# Patient Record
Sex: FEMALE | Race: WHITE | NOT HISPANIC OR LATINO | Employment: OTHER | URBAN - METROPOLITAN AREA
[De-identification: names, ages, dates, MRNs, and addresses within clinical notes are randomized per-mention and may not be internally consistent; named-entity substitution may affect disease eponyms.]

---

## 2017-01-31 ENCOUNTER — TELEPHONE (OUTPATIENT)
Dept: GASTROENTEROLOGY | Facility: CLINIC | Age: 62
End: 2017-01-31

## 2017-01-31 NOTE — TELEPHONE ENCOUNTER
----- Message from Fransisco Bravo MD sent at 1/31/2017  3:34 PM CST -----  Contact: self - [phone]  Nothing stronger. No narcotics either.    ----- Message -----     From: Joslyn Holder MA     Sent: 1/31/2017   3:30 PM       To: Fransisco Bravo MD    Please advise   ----- Message -----     From: Leta Cannon     Sent: 1/31/2017   3:23 PM       To: Lawrence Bravo - wants something stronger than dicyclomine for her colon spasms - please call patient at

## 2017-03-08 ENCOUNTER — HISTORICAL (OUTPATIENT)
Dept: ADMINISTRATIVE | Facility: HOSPITAL | Age: 62
End: 2017-03-08

## 2017-03-14 ENCOUNTER — HISTORICAL (OUTPATIENT)
Dept: ADMINISTRATIVE | Facility: HOSPITAL | Age: 62
End: 2017-03-14

## 2017-03-21 ENCOUNTER — HISTORICAL (OUTPATIENT)
Dept: SURGERY | Facility: HOSPITAL | Age: 62
End: 2017-03-21

## 2017-05-15 ENCOUNTER — HISTORICAL (OUTPATIENT)
Dept: RADIOLOGY | Facility: HOSPITAL | Age: 62
End: 2017-05-15

## 2017-06-05 ENCOUNTER — HISTORICAL (OUTPATIENT)
Dept: ADMINISTRATIVE | Facility: HOSPITAL | Age: 62
End: 2017-06-05

## 2017-06-05 LAB
APPEARANCE, UA: ABNORMAL
BACTERIA #/AREA URNS AUTO: ABNORMAL /[HPF]
BILIRUB UR QL STRIP: NEGATIVE
COLOR UR: ABNORMAL
GLUCOSE (UA): NORMAL
HGB UR QL STRIP: 0.1 MG/DL
HYALINE CASTS #/AREA URNS LPF: ABNORMAL /[LPF]
KETONES UR QL STRIP: NEGATIVE
LEUKOCYTE ESTERASE UR QL STRIP: 75 LEU/UL
MUCOUS THREADS URNS QL MICRO: ABNORMAL
NITRITE UR QL STRIP: NEGATIVE
PH UR STRIP: 5.5 [PH] (ref 4.5–8)
PROT UR QL STRIP: 10 MG/DL
RBC #/AREA URNS AUTO: ABNORMAL /[HPF]
SP GR UR STRIP: 1.03 (ref 1–1.03)
SQUAMOUS #/AREA URNS LPF: ABNORMAL /[LPF]
UROBILINOGEN UR STRIP-ACNC: NORMAL
WBC #/AREA URNS AUTO: ABNORMAL /HPF

## 2017-06-07 LAB — FINAL CULTURE: NORMAL

## 2018-04-02 ENCOUNTER — HISTORICAL (OUTPATIENT)
Dept: LAB | Facility: HOSPITAL | Age: 63
End: 2018-04-02

## 2018-04-02 LAB
ABS NEUT (OLG): 2.96 X10(3)/MCL (ref 2.1–9.2)
BASOPHILS # BLD AUTO: 0 X10(3)/MCL (ref 0–0.2)
BASOPHILS NFR BLD AUTO: 0 %
BUN SERPL-MCNC: 23 MG/DL (ref 7–18)
CALCIUM SERPL-MCNC: 9.2 MG/DL (ref 8.5–10.1)
CHLORIDE SERPL-SCNC: 105 MMOL/L (ref 98–107)
CO2 SERPL-SCNC: 31 MMOL/L (ref 21–32)
CREAT SERPL-MCNC: 0.51 MG/DL (ref 0.55–1.02)
CREAT/UREA NIT SERPL: 45.1
EOSINOPHIL # BLD AUTO: 0.1 X10(3)/MCL (ref 0–0.9)
EOSINOPHIL NFR BLD AUTO: 2 %
ERYTHROCYTE [DISTWIDTH] IN BLOOD BY AUTOMATED COUNT: 12.6 % (ref 11.5–17)
GLUCOSE SERPL-MCNC: 80 MG/DL (ref 74–106)
HCT VFR BLD AUTO: 36 % (ref 37–47)
HGB BLD-MCNC: 11.6 GM/DL (ref 12–16)
LYMPHOCYTES # BLD AUTO: 2 X10(3)/MCL (ref 0.6–4.6)
LYMPHOCYTES NFR BLD AUTO: 34 %
MCH RBC QN AUTO: 28.9 PG (ref 27–31)
MCHC RBC AUTO-ENTMCNC: 32.2 GM/DL (ref 33–36)
MCV RBC AUTO: 89.6 FL (ref 80–94)
MONOCYTES # BLD AUTO: 0.7 X10(3)/MCL (ref 0.1–1.3)
MONOCYTES NFR BLD AUTO: 12 %
NEUTROPHILS # BLD AUTO: 2.96 X10(3)/MCL (ref 1.4–7.9)
NEUTROPHILS NFR BLD AUTO: 51 %
PLATELET # BLD AUTO: 194 X10(3)/MCL (ref 130–400)
PMV BLD AUTO: 10.3 FL (ref 9.4–12.4)
POTASSIUM SERPL-SCNC: 4 MMOL/L (ref 3.5–5.1)
RBC # BLD AUTO: 4.02 X10(6)/MCL (ref 4.2–5.4)
SODIUM SERPL-SCNC: 140 MMOL/L (ref 136–145)
TSH SERPL-ACNC: <0.007 MIU/ML (ref 0.36–3.74)
WBC # SPEC AUTO: 5.8 X10(3)/MCL (ref 4.5–11.5)

## 2018-04-12 ENCOUNTER — HISTORICAL (OUTPATIENT)
Dept: RADIOLOGY | Facility: HOSPITAL | Age: 63
End: 2018-04-12

## 2018-06-25 ENCOUNTER — HISTORICAL (OUTPATIENT)
Dept: INFUSION THERAPY | Facility: HOSPITAL | Age: 63
End: 2018-06-25

## 2018-07-02 ENCOUNTER — HISTORICAL (OUTPATIENT)
Dept: INFUSION THERAPY | Facility: HOSPITAL | Age: 63
End: 2018-07-02

## 2018-07-09 ENCOUNTER — HISTORICAL (OUTPATIENT)
Dept: INFUSION THERAPY | Facility: HOSPITAL | Age: 63
End: 2018-07-09

## 2018-07-16 ENCOUNTER — HISTORICAL (OUTPATIENT)
Dept: INFUSION THERAPY | Facility: HOSPITAL | Age: 63
End: 2018-07-16

## 2018-09-12 ENCOUNTER — HISTORICAL (OUTPATIENT)
Dept: ADMINISTRATIVE | Facility: HOSPITAL | Age: 63
End: 2018-09-12

## 2018-09-12 LAB
ABS NEUT (OLG): 2.65 X10(3)/MCL (ref 2.1–9.2)
ALBUMIN SERPL-MCNC: 3.5 GM/DL (ref 3.4–5)
ALBUMIN/GLOB SERPL: 1.3 {RATIO}
ALP SERPL-CCNC: 193 UNIT/L (ref 38–126)
ALT SERPL-CCNC: 23 UNIT/L (ref 12–78)
APPEARANCE, UA: CLEAR
AST SERPL-CCNC: 12 UNIT/L (ref 15–37)
BACTERIA SPEC CULT: ABNORMAL /HPF
BASOPHILS # BLD AUTO: 0 X10(3)/MCL (ref 0–0.2)
BASOPHILS NFR BLD AUTO: 0 %
BILIRUB SERPL-MCNC: 0.5 MG/DL (ref 0.2–1)
BILIRUB UR QL STRIP: NEGATIVE
BILIRUBIN DIRECT+TOT PNL SERPL-MCNC: 0.1 MG/DL (ref 0–0.2)
BILIRUBIN DIRECT+TOT PNL SERPL-MCNC: 0.4 MG/DL (ref 0–0.8)
BUN SERPL-MCNC: 16 MG/DL (ref 7–18)
CALCIUM SERPL-MCNC: 8.8 MG/DL (ref 8.5–10.1)
CHLORIDE SERPL-SCNC: 109 MMOL/L (ref 98–107)
CHOLEST SERPL-MCNC: 178 MG/DL (ref 0–200)
CHOLEST/HDLC SERPL: 2.9 {RATIO} (ref 0–4)
CO2 SERPL-SCNC: 30 MMOL/L (ref 21–32)
COLOR UR: ABNORMAL
CREAT SERPL-MCNC: 0.71 MG/DL (ref 0.55–1.02)
EOSINOPHIL # BLD AUTO: 0.1 X10(3)/MCL (ref 0–0.9)
EOSINOPHIL NFR BLD AUTO: 1 %
ERYTHROCYTE [DISTWIDTH] IN BLOOD BY AUTOMATED COUNT: 12.6 % (ref 11.5–17)
GLOBULIN SER-MCNC: 2.7 GM/DL (ref 2.4–3.5)
GLUCOSE (UA): NEGATIVE
GLUCOSE SERPL-MCNC: 111 MG/DL (ref 74–106)
HCT VFR BLD AUTO: 41.1 % (ref 37–47)
HDLC SERPL-MCNC: 62 MG/DL (ref 35–60)
HGB BLD-MCNC: 13 GM/DL (ref 12–16)
HGB UR QL STRIP: NEGATIVE
KETONES UR QL STRIP: ABNORMAL
LDLC SERPL CALC-MCNC: 96 MG/DL (ref 0–129)
LEUKOCYTE ESTERASE UR QL STRIP: ABNORMAL
LYMPHOCYTES # BLD AUTO: 1.6 X10(3)/MCL (ref 0.6–4.6)
LYMPHOCYTES NFR BLD AUTO: 33 %
MCH RBC QN AUTO: 29.5 PG (ref 27–31)
MCHC RBC AUTO-ENTMCNC: 31.6 GM/DL (ref 33–36)
MCV RBC AUTO: 93.4 FL (ref 80–94)
MONOCYTES # BLD AUTO: 0.4 X10(3)/MCL (ref 0.1–1.3)
MONOCYTES NFR BLD AUTO: 10 %
NEUTROPHILS # BLD AUTO: 2.65 X10(3)/MCL (ref 1.4–7.9)
NEUTROPHILS NFR BLD AUTO: 56 %
NITRITE UR QL STRIP: NEGATIVE
PH UR STRIP: 6.5 [PH] (ref 5–9)
PLATELET # BLD AUTO: 166 X10(3)/MCL (ref 130–400)
PMV BLD AUTO: 10.1 FL (ref 9.4–12.4)
POTASSIUM SERPL-SCNC: 4.1 MMOL/L (ref 3.5–5.1)
PROT SERPL-MCNC: 6.2 GM/DL (ref 6.4–8.2)
PROT UR QL STRIP: NEGATIVE
RBC # BLD AUTO: 4.4 X10(6)/MCL (ref 4.2–5.4)
RBC #/AREA URNS HPF: 2 /HPF (ref 0–2)
SODIUM SERPL-SCNC: 145 MMOL/L (ref 136–145)
SP GR UR STRIP: 1.03 (ref 1–1.03)
SQUAMOUS EPITHELIAL, UA: 5 /HPF (ref 0–4)
TRIGL SERPL-MCNC: 101 MG/DL (ref 30–150)
TSH SERPL-ACNC: <0.007 MIU/L (ref 0.36–3.74)
UROBILINOGEN UR STRIP-ACNC: 0.2
VLDLC SERPL CALC-MCNC: 20 MG/DL
WBC # SPEC AUTO: 4.7 X10(3)/MCL (ref 4.5–11.5)
WBC #/AREA URNS HPF: 3 /HPF (ref 0–3)

## 2021-10-26 ENCOUNTER — HISTORICAL (OUTPATIENT)
Dept: RADIOLOGY | Facility: HOSPITAL | Age: 66
End: 2021-10-26

## 2021-11-01 ENCOUNTER — HISTORICAL (OUTPATIENT)
Dept: ANESTHESIOLOGY | Facility: HOSPITAL | Age: 66
End: 2021-11-01

## 2022-04-29 VITALS
BODY MASS INDEX: 31.18 KG/M2 | HEIGHT: 66 IN | WEIGHT: 194 LBS | DIASTOLIC BLOOD PRESSURE: 85 MMHG | SYSTOLIC BLOOD PRESSURE: 141 MMHG

## 2022-05-04 NOTE — HISTORICAL [facility] CERNER
This is a historical note converted from Leon. Formatting and pictures may have been removed.  Please reference Leon for original formatting and attached multimedia. Chief Complaint  IC, SPASMS OF BLADDER  History of Present Illness  63yo with h/o chronic IC s/p cystoscopy with hydrodistension on 3/21/17 presents for f/u of IC symptoms. She reports much improvement with her symptoms the month following her cystoscopy; however she reports increased symptoms of suprapubic cramping and back pain.?She has previously been compliant with the IC diet, but had strawberries ~1wk ago with worsening in symptoms. States that she sometimes has to force urine out and experiences bladder pressure.?She has not taken her amitriptyline as she was concerned about the interaction with the ambien that she takes?to sleep. Also not taking cemitidine. Reports vaginal odor x1wk that smells fishy.  Review of Systems  See HPI  Physical Exam  Vitals & Measurements  T:?36.8? ?C ?(Oral)? HR:?60?(Peripheral)? RR:?20? BP:?141/85? HT:?168?cm? HT:?168?cm? WT:?88?kg? WT:?88?kg? BMI:?31.18?  ?  NAD, AAO  RRR  CTAB  Ab: soft, ND, suprapubic tenderness  : normal external genitalia, atrophic vaginal, thin white frothy vaginal discharge, no cervicitis, no bleeding, no CMT, small nontender uterus with no adnexal fullness or tenderness  Extrem: NT, no edema  ?  wet prep: + clue cells, + whiff test  Assessment/Plan  Ordered:  cimetidine, 400 mg = 1 tab(s), Oral, BID, X 30 day(s), # 60 tab(s), 3 Refill(s)  conjugated estrogens topical, 0.5 gm =, VAG, qPM, twice weekly at bedtime within the vagina, # 42.5 gm, 3 Refill(s)  metroNIDAZOLE, 500 mg = 1 tab(s), Oral, q12hr, X 7 day(s), # 14 tab(s), 0 Refill(s)  Urinalysis with Microscopic if Indicated, Routine collect, Urine, Order for future visit, 06/05/17 16:46:00 CDT, Stop date 06/05/17 16:46:00 CDT, Nurse collect, IC (interstitial cystitis), 06/05/17 16:46:00 CDT  Urine Culture [id number], Routine collect,  17 16:46:00 CDT, Order for future visit, Urine, Clean Catch, Nurse collect, Stop date 17 16:46:00 CDT, IC (interstitial cystitis)  -IC: recommend continued amitriptyline and cimetidine. Continue IC diet (symptoms previously controlled on diet). UA and culture today. RTC 3 months for f/u of symptoms  -Vaginal atrophy: Rx for premarin  -BV: Rx for flagyl   Problem List/Past Medical History  Back pain, chronic  Depression  Disc degeneration, lumbar  GERD (gastroesophageal reflux disease)  HLD (hyperlipidemia)  Hypertension  IBS (irritable bowel syndrome)  Interstitial cystitis  Obesity  Historical  Anxiety  Chronic pain syndrome  Constipation  Fibromyalgia  GERD (gastroesophageal reflux disease)  HTN (hypertension)  Hyperlipidemia  Restless legs  Tarsal tunnel syndrome  Procedure/Surgical History  Cystoscopy (None) (2017), Cystourethroscopy, with dilation of bladder for interstitial cystitis; general or conduction (spinal) anesthesia (2017), Release of tarsal tunnel (2013), Release, tarsal tunnel (posterior tibial nerve decompression). (2013), Arthroscopy, darrick total knee,  delivery, jaw surgery, stomach surgery, Tonsillectomy.  Medications  AcipHex 20 mg ([facility] Substitution), 40 mg, Oral, At Bedtime,? ?Not taking  Amitiza 24 mcg oral capsule, 24 mcg, 1 cap(s), Oral, At Bedtime,? ?Not taking  amitriptyline 10 mg oral tablet, 20 mg, 2 tab(s), Oral, Once a day (at bedtime), 3 refills  aspirin 81 mg oral tablet, 81 mg, 1 tab(s), Oral, Daily  atorvastatin 40 mg oral tablet, 40 mg, 1 tab(s), Oral, Daily  baclofen 10 mg oral tablet, 10 mg, 1 tab(s), Oral, TID  carvedilol 12.5 mg oral tablet, 12.5 mg, 1 tab(s), Oral, BID  Cleocin ORAL cap., 300 mg, Oral, TIDPC,? ?Not taking  ClonAZEpam 1 mg oral tablet, 1 mg, 1 tab(s), Oral, At Bedtime,? ?Not taking  Cymbalta 60 mg oral delayed release capsule, 60 mg, 1 cap(s), Oral, Daily  dicyclomine 20 mg oral tablet, 20 mg, 1 tab(s), Oral,  QID  DMSO-Hep-Lido-Dex IRRIGATION - 60 mL  Duexis 800 mg-26.6 mg oral tablet, 1 tab(s), Oral, TID,? ?Not taking  Elmiron 100 mg oral capsule, 100 mg, 1 cap(s), Oral, TIDAC, 3 refills,? ?Not taking  Fish Oil, 1200 mg, Oral, At Bedtime  Frova, 2 mg, Oral, TID, PRN,? ?Not taking  hyoscyamine 0.125 mg sublingual tablet, 0.125 mg, 1 tab(s), SL, q4hr, PRN, 3 refills,? ?Not taking  ibuprofen 800 mg oral tablet, 800 mg, 1 tab(s), Oral, TID  losartan 100 mg oral tablet, 100 mg, 1 tab(s), Oral, Daily  magnesium citrate 150 mg oral capsule  metoprolol succinate 50 mg oral tablet extended release, 50 mg, 1 tab(s), Oral, Daily,? ?Not taking: OUT  Micardis 80 mg oral tablet, 80 mg, 1 tab(s), Oral, At Bedtime,? ?Not taking  NAB/AMI/GN/LID/PRIL/GEORGETTE, 1 pump(s), TOP, 5x/Day  Neurontin 300 mg oral capsule, 300 mg, 1 cap(s), Oral, BID,? ?Not taking  potassium gluconate 550 mg oral tablet, 1 tab(s), Oral, 5x/Day  progesterone 200 mg oral capsule, 200 mg, 1 cap(s), Oral, At Bedtime,? ?Not taking  traMADOL 50 mg oral tablet, 50 mg, 1 tab(s), Oral, q6hr, PRN  Tylenol No 3 oral tablet  Urolet MB oral tablet  Vitamin D3 1000 intl units oral tablet, 1000 IntUnit, 1 tab(s), Oral, Daily  Zanaflex 2 mg oral capsule, 1 tab(s), Oral, q8hr,? ?Not taking  zolpidem 5 mg oral tablet, 10 mg, 2 tab(s), Oral, Once a day (at bedtime), PRN  Allergies  Tape, silk?(blisters)  penicillins?(rash)  Social History  Alcohol - Denies Alcohol Use  Never  Employment/School  Unemployed  Home/Environment  Lives with Spouse.  Nutrition/Health  Regular  Substance Abuse - Denies Substance Abuse  Never  Tobacco - Denies Tobacco Use  Never smoker  Family History  Congestive heart disease.: Mother.  Heart disease: Father.  Hypertension.: Brother.

## 2023-07-27 ENCOUNTER — HOSPITAL ENCOUNTER (EMERGENCY)
Facility: HOSPITAL | Age: 68
Discharge: HOME | End: 2023-07-27
Attending: STUDENT IN AN ORGANIZED HEALTH CARE EDUCATION/TRAINING PROGRAM
Payer: MEDICARE

## 2023-07-27 VITALS
HEIGHT: 66 IN | SYSTOLIC BLOOD PRESSURE: 159 MMHG | RESPIRATION RATE: 15 BRPM | TEMPERATURE: 98 F | HEART RATE: 67 BPM | DIASTOLIC BLOOD PRESSURE: 99 MMHG | OXYGEN SATURATION: 99 % | WEIGHT: 189 LBS | BODY MASS INDEX: 30.37 KG/M2

## 2023-07-27 DIAGNOSIS — R11.2 NAUSEA AND VOMITING, UNSPECIFIED VOMITING TYPE: ICD-10-CM

## 2023-07-27 DIAGNOSIS — N20.1 URETEROLITHIASIS: Primary | ICD-10-CM

## 2023-07-27 DIAGNOSIS — R10.32 LEFT LOWER QUADRANT ABDOMINAL PAIN: ICD-10-CM

## 2023-07-27 LAB
ALBUMIN SERPL-MCNC: 4.1 G/DL (ref 3.4–4.8)
ALBUMIN/GLOB SERPL: 1.5 RATIO (ref 1.1–2)
ALP SERPL-CCNC: 97 UNIT/L (ref 40–150)
ALT SERPL-CCNC: 9 UNIT/L (ref 0–55)
APPEARANCE UR: ABNORMAL
AST SERPL-CCNC: 22 UNIT/L (ref 5–34)
BACTERIA #/AREA URNS AUTO: ABNORMAL /HPF
BASOPHILS # BLD AUTO: 0.08 X10(3)/MCL
BASOPHILS NFR BLD AUTO: 0.9 %
BILIRUB UR QL STRIP.AUTO: NEGATIVE
BILIRUBIN DIRECT+TOT PNL SERPL-MCNC: 0.9 MG/DL
BUN SERPL-MCNC: 15.3 MG/DL (ref 9.8–20.1)
CALCIUM SERPL-MCNC: 9.1 MG/DL (ref 8.4–10.2)
CAOX CRY URNS QL MICRO: ABNORMAL /HPF
CHLORIDE SERPL-SCNC: 106 MMOL/L (ref 98–107)
CO2 SERPL-SCNC: 27 MMOL/L (ref 23–31)
COLOR UR: YELLOW
CREAT SERPL-MCNC: 1.01 MG/DL (ref 0.55–1.02)
EOSINOPHIL # BLD AUTO: 0.15 X10(3)/MCL (ref 0–0.9)
EOSINOPHIL NFR BLD AUTO: 1.7 %
ERYTHROCYTE [DISTWIDTH] IN BLOOD BY AUTOMATED COUNT: 12.8 % (ref 11.5–17)
GFR SERPLBLD CREATININE-BSD FMLA CKD-EPI: >60 MLS/MIN/1.73/M2
GLOBULIN SER-MCNC: 2.7 GM/DL (ref 2.4–3.5)
GLUCOSE SERPL-MCNC: 150 MG/DL (ref 82–115)
GLUCOSE UR QL STRIP.AUTO: NEGATIVE
HCT VFR BLD AUTO: 38.7 % (ref 37–47)
HGB BLD-MCNC: 12.2 G/DL (ref 12–16)
IMM GRANULOCYTES # BLD AUTO: 0.02 X10(3)/MCL (ref 0–0.04)
IMM GRANULOCYTES NFR BLD AUTO: 0.2 %
KETONES UR QL STRIP.AUTO: ABNORMAL
LEUKOCYTE ESTERASE UR QL STRIP.AUTO: ABNORMAL
LIPASE SERPL-CCNC: 39 U/L
LYMPHOCYTES # BLD AUTO: 3.05 X10(3)/MCL (ref 0.6–4.6)
LYMPHOCYTES NFR BLD AUTO: 35.5 %
MCH RBC QN AUTO: 28.4 PG (ref 27–31)
MCHC RBC AUTO-ENTMCNC: 31.5 G/DL (ref 33–36)
MCV RBC AUTO: 90.2 FL (ref 80–94)
MONOCYTES # BLD AUTO: 0.71 X10(3)/MCL (ref 0.1–1.3)
MONOCYTES NFR BLD AUTO: 8.3 %
MUCOUS THREADS URNS QL MICRO: ABNORMAL /LPF
NEUTROPHILS # BLD AUTO: 4.57 X10(3)/MCL (ref 2.1–9.2)
NEUTROPHILS NFR BLD AUTO: 53.4 %
NITRITE UR QL STRIP.AUTO: NEGATIVE
NRBC BLD AUTO-RTO: 0 %
PH UR STRIP.AUTO: 6.5 [PH]
PLATELET # BLD AUTO: 214 X10(3)/MCL (ref 130–400)
PMV BLD AUTO: 10.6 FL (ref 7.4–10.4)
POTASSIUM SERPL-SCNC: 3.9 MMOL/L (ref 3.5–5.1)
PROT SERPL-MCNC: 6.8 GM/DL (ref 5.8–7.6)
PROT UR QL STRIP.AUTO: NEGATIVE
RBC # BLD AUTO: 4.29 X10(6)/MCL (ref 4.2–5.4)
RBC #/AREA URNS AUTO: ABNORMAL /HPF
RBC UR QL AUTO: ABNORMAL
SODIUM SERPL-SCNC: 143 MMOL/L (ref 136–145)
SP GR UR STRIP.AUTO: 1.02 (ref 1–1.03)
SQUAMOUS #/AREA URNS AUTO: <5 /HPF
UROBILINOGEN UR STRIP-ACNC: 0.2
WBC # SPEC AUTO: 8.58 X10(3)/MCL (ref 4.5–11.5)
WBC #/AREA URNS AUTO: 17 /HPF

## 2023-07-27 PROCEDURE — 81001 URINALYSIS AUTO W/SCOPE: CPT | Performed by: EMERGENCY MEDICINE

## 2023-07-27 PROCEDURE — 85025 COMPLETE CBC W/AUTO DIFF WBC: CPT | Performed by: EMERGENCY MEDICINE

## 2023-07-27 PROCEDURE — 80053 COMPREHEN METABOLIC PANEL: CPT | Performed by: EMERGENCY MEDICINE

## 2023-07-27 PROCEDURE — 63600175 PHARM REV CODE 636 W HCPCS: Performed by: STUDENT IN AN ORGANIZED HEALTH CARE EDUCATION/TRAINING PROGRAM

## 2023-07-27 PROCEDURE — 25500020 PHARM REV CODE 255: Performed by: STUDENT IN AN ORGANIZED HEALTH CARE EDUCATION/TRAINING PROGRAM

## 2023-07-27 PROCEDURE — 87088 URINE BACTERIA CULTURE: CPT | Performed by: EMERGENCY MEDICINE

## 2023-07-27 PROCEDURE — 96360 HYDRATION IV INFUSION INIT: CPT | Mod: 59

## 2023-07-27 PROCEDURE — 83690 ASSAY OF LIPASE: CPT | Performed by: EMERGENCY MEDICINE

## 2023-07-27 PROCEDURE — 99285 EMERGENCY DEPT VISIT HI MDM: CPT | Mod: 25

## 2023-07-27 RX ORDER — KETOROLAC TROMETHAMINE 30 MG/ML
15 INJECTION, SOLUTION INTRAMUSCULAR; INTRAVENOUS
Status: DISCONTINUED | OUTPATIENT
Start: 2023-07-27 | End: 2023-07-27 | Stop reason: HOSPADM

## 2023-07-27 RX ORDER — TAMSULOSIN HYDROCHLORIDE 0.4 MG/1
0.4 CAPSULE ORAL DAILY
Qty: 10 CAPSULE | Refills: 0 | Status: SHIPPED | OUTPATIENT
Start: 2023-07-27 | End: 2023-08-06

## 2023-07-27 RX ORDER — KETOROLAC TROMETHAMINE 10 MG/1
10 TABLET, FILM COATED ORAL EVERY 6 HOURS
Qty: 16 TABLET | Refills: 0 | Status: SHIPPED | OUTPATIENT
Start: 2023-07-27 | End: 2023-07-31

## 2023-07-27 RX ADMIN — SODIUM CHLORIDE, POTASSIUM CHLORIDE, SODIUM LACTATE AND CALCIUM CHLORIDE 1000 ML: 600; 310; 30; 20 INJECTION, SOLUTION INTRAVENOUS at 07:07

## 2023-07-27 RX ADMIN — IOPAMIDOL 100 ML: 755 INJECTION, SOLUTION INTRAVENOUS at 07:07

## 2023-07-27 NOTE — DISCHARGE INSTRUCTIONS
Thanks for letting us take care of you today!  It is our goal to give you courteous care and to keep you comfortable and informed, if you have any questions before you leave I will be happy to try and answer them.    Here is some advice after your visit:    Your visit in the emergency department is NOT definitive care - please follow-up with your primary care doctor and/or specialist within 1-2 days. Please return to the emergency department if you develop worsening symptoms including: fever, chills, chest pain, shortness of breath, weakness, numbness, tingling, nausea, vomiting, inability to eat, drink, or take your medication. Please return if you have any worsening in your condition or if you have any other concerns.    If you had radiology exams like an XRAY or CT in the emergency Department the interpreation on them may be preliminary - there may be less time sensitive findings on the reports please obtain these reports within 24 hours from the hospital or by using your out on your mobile phone to access records.  Bring these to your primary care doctor and/or specialist for further review of incidental findings.    Please review any LAB WORK from your visit today with your primary care physician.    You have been prescribed ketorolac/Toradol.  If you are taking his medication please do not take any additional NSAIDs including celebrex/celecoxib,ibuprofen, naproxen, indomethacin.  Please stay hydrated when taking this medication.    Please follow up with your primary care physician, please return emergency department if you have any worsening symptoms fever chills nausea vomiting.    Please follow-up with your urologist.

## 2023-07-27 NOTE — ED PROVIDER NOTES
Encounter Date: 2023    SCRIBE #1 NOTE: I, Jennifer Marcella, am scribing for, and in the presence of,  Thomas Mackey MD. I have scribed the following portions of the note - Other sections scribed: HPI, ROS and physical.     History     Chief Complaint   Patient presents with    Abdominal Pain     C/o left lower abd pain that radiates to her back with n/v that began last night.      This is a 67 y/o female with a medical hx of HTN and chronic back pain that presents to the ED for abdominal pain onset . Pt states that the pain is present in the left lower quadrant of her abdomen and radiates into her back. Pt reports episodes of emesis last night,  with the most recent episode being a few hours ago. Denies dysuria, hematuria, diarrhea, fever, chills. Reports abdominal pain, back pain, n/v, chronic constipation.     Pt denies hx of diverticulitis and kidney stones. The pt's PCP is Haroldo Escalona MD        The history is provided by the patient. No  was used.   Review of patient's allergies indicates:   Allergen Reactions    Adhesive tape-silicones     Penicillins      Past Medical History:   Diagnosis Date    Chronic back pain     Hypertension     OA (osteoarthritis)      Past Surgical History:   Procedure Laterality Date     SECTION      MANDIBLE SURGERY      TOTAL KNEE ARTHROPLASTY      bilateral     Family History   Problem Relation Age of Onset    Colon cancer Neg Hx      Social History     Tobacco Use    Smoking status: Never   Substance Use Topics    Alcohol use: No     Review of Systems   Constitutional:  Negative for chills and fever.   Gastrointestinal:  Positive for abdominal pain, constipation, nausea and vomiting. Negative for diarrhea.   Genitourinary:  Negative for dysuria and hematuria.   Musculoskeletal:  Positive for back pain.     Physical Exam     Initial Vitals [23 0436]   BP Pulse Resp Temp SpO2   (!) 208/97 65 18 97.6 °F (36.4 °C) 98 %      MAP        --         Physical Exam    Nursing note and vitals reviewed.  Constitutional: She appears well-developed and well-nourished. She is not diaphoretic.  Non-toxic appearance. No distress.   HENT:   Head: Normocephalic and atraumatic.   Right Ear: External ear normal.   Left Ear: External ear normal.   Nose: Nose normal.   Eyes: Conjunctivae and EOM are normal. Pupils are equal, round, and reactive to light. Right eye exhibits no discharge. Left eye exhibits no discharge.   Cardiovascular:  Normal rate, regular rhythm, normal heart sounds and intact distal pulses.     Exam reveals no gallop and no friction rub.       No murmur heard.  Pulmonary/Chest: Breath sounds normal. No respiratory distress. She has no wheezes. She has no rhonchi. She has no rales. She exhibits no tenderness.   Abdominal: Abdomen is soft. Bowel sounds are normal. She exhibits no distension and no mass. There is abdominal tenderness in the left lower quadrant.   No CVA tenderness There is no rebound and no guarding.   Musculoskeletal:         General: No edema. Normal range of motion.     Neurological: She is alert and oriented to person, place, and time. No cranial nerve deficit or sensory deficit.   Skin: Skin is warm and dry. Capillary refill takes less than 2 seconds. No erythema. No pallor.       ED Course   Procedures  Labs Reviewed   COMPREHENSIVE METABOLIC PANEL - Abnormal; Notable for the following components:       Result Value    Glucose Level 150 (*)     All other components within normal limits   URINALYSIS, REFLEX TO URINE CULTURE - Abnormal; Notable for the following components:    Appearance, UA Cloudy (*)     Ketones, UA Trace (*)     Blood, UA 2+ (*)     Leukocyte Esterase, UA 2+ (*)     All other components within normal limits   CBC WITH DIFFERENTIAL - Abnormal; Notable for the following components:    MCHC 31.5 (*)     MPV 10.6 (*)     All other components within normal limits   URINALYSIS, MICROSCOPIC - Abnormal; Notable  for the following components:    RBC, UA 11-20 (*)     WBC, UA 17 (*)     Mucous, UA Moderate (*)     Calcium Oxalate Crystals, UA Few (*)     All other components within normal limits   LIPASE - Normal   CULTURE, URINE   CBC W/ AUTO DIFFERENTIAL    Narrative:     The following orders were created for panel order CBC W/ AUTO DIFFERENTIAL.  Procedure                               Abnormality         Status                     ---------                               -----------         ------                     CBC with Differential[[id number]]        Abnormal            Final result                 Please view results for these tests on the individual orders.          Imaging Results              CT Abdomen Pelvis With Contrast (Final result)  Result time 07/27/23 08:29:05      Final result by eZ Hendrickson MD (07/27/23 08:29:05)                   Impression:      3 mm proximal left ureteral stone resulting in mild left hydronephrosis.      Electronically signed by: Ze Hendrickson  Date:    07/27/2023  Time:    08:29               Narrative:    EXAMINATION:  CT ABDOMEN PELVIS WITH CONTRAST    CLINICAL HISTORY:  LLQ abdominal pain;    TECHNIQUE:  CT imaging of the abdomen and pelvis after intravenous contrast. Dose length product 469 mGycm. Automatic exposure control, adjustment of mA/kV or iterative reconstruction technique used to limit radiation dose.    COMPARISON:  CT 06/26/2014    FINDINGS:  Liver/biliary: Normal liver.  No radiodense gallstones. No intra or extrahepatic biliary ductal dilation.    Pancreas: Normal.    Spleen: Normal.    Adrenals: Normal.    Genitourinary: 3 mm stone in the proximal left ureter.  It results in mild left hydronephrosis.  No collecting system dilatation on the right.  Bladder within normal limits. No pelvic mass.    Stomach/bowel: No evidence of bowel obstruction. Appendix not confidently seen.  No definitive sites of bowel inflammation.    Lymph nodes/peritoneum: No  pathologically enlarged lymph node identified. No ascites or free air. No fluid collection.    Vasculature: Mild aortic and iliac artery calcifications.    Abdominal wall: Normal.    Lung bases: No consolidation or pleural effusion.    Musculoskeletal: No acute osseous findings.                                       Medications   ketorolac injection 15 mg (15 mg Intravenous Not Given 7/27/23 0900)   lactated ringers bolus 1,000 mL (0 mLs Intravenous Stopped 7/27/23 0821)   iopamidoL (ISOVUE-370) injection 100 mL (100 mLs Intravenous Given 7/27/23 0751)              Scribe Attestation:   Scribe #1: I performed the above scribed service and the documentation accurately describes the services I performed. I attest to the accuracy of the note.    Attending Attestation:           Physician Attestation for Scribe:  Physician Attestation Statement for Scribe #1: I, Thomas Mackey MD, reviewed documentation, as scribed by Jennifer Naranjo in my presence, and it is both accurate and complete.       Medical Decision Making  Patient presents with left lower quadrant abdominal pain.    Differential Diagnosis includes, but is not limited to: appendicitis, diverticulitis,  intraabdominal abcess, retroperitoneal abcess, gastritis, gastroenteritis, hernia, pancreatitis, inflammatory bowel disease, PUD, SBP, nephrolithiasis, DKA, consitpation, GERD, IBS    No diverticulitis on CT.  There is a 3 mm stone.  Expected to pass this spontaneously due to small size.  We will discharge.  Please see ED course for further MDM.      Amount and/or Complexity of Data Reviewed  External Data Reviewed: notes.     Details: See ED course  Labs: ordered. Decision-making details documented in ED Course.  Radiology: ordered and independent interpretation performed.     Details: Small stone    Risk  Prescription drug management.           ED Course as of 07/27/23 1008   Thu Jul 27, 2023   0636 Lipase: 39 [MM]   0636 Comp. Metabolic Panel(!)  No  electrolyte abnormality, no renal dysfunction.  Hyperglycemia. [MM]   0636 CBC W/ AUTO DIFFERENTIAL(!)  No anemia, no leukocytosis. [MM]   0639 Chart review reveals office visit with maria [facility] 04/06/2023.  Diagnosed at that time history of irritable bowel syndrome, Graves, essential hypertension.  Reportedly at that time had a good appetite. [MM]   0845 Urinalysis, Microscopic(!)  RBCs and WBCs however no bacteria seen. [MM]   0910 Patient is awake alert well-appearing.  Able to ambulate back and forth to the restroom without any difficulty.  On re-evaluation she denies any worsening symptoms.  CT scan shows 3 mm stone.  She was no evidence of infection.  Already is on pain contract has some pain medicine at home.  Will discharge with Toradol, Flomax.  Reports she was no longer taking the Celebrex.  Suitable for discharge home at this time.  Already has a urologist in [place].  Return precautions given.  Questions invited, questions answered to the best my ability.  Patient discharged home condition stable.   [MM]      ED Course User Index  [MM] Thomas Mackey MD                 Clinical Impression:   Final diagnoses:  [N20.1] Ureterolithiasis (Primary)  [R10.32] Left lower quadrant abdominal pain  [R11.2] Nausea and vomiting, unspecified vomiting type        ED Disposition Condition    Discharge Stable          ED Prescriptions       Medication Sig Dispense Start Date End Date Auth. Provider    tamsulosin (FLOMAX) 0.4 mg Cap Take 1 capsule (0.4 mg total) by mouth once daily. for 10 days 10 capsule 7/27/2023 8/6/2023 Thomas Mackey MD    ketorolac (TORADOL) 10 mg tablet Take 1 tablet (10 mg total) by mouth every 6 (six) hours. for 4 days 16 tablet 7/27/2023 7/31/2023 Thomas Mackey MD          Follow-up Information       Follow up With Specialties Details Why Contact Info    Haroldo Escalona MD Family Medicine Call   [address] [facility] [address] [phone]               Thomas  JUDE Mackey MD  07/27/23 1000

## 2023-07-29 LAB — BACTERIA UR CULT: NORMAL

## 2023-11-15 ENCOUNTER — HOSPITAL ENCOUNTER (EMERGENCY)
Facility: HOSPITAL | Age: 68
Discharge: HOME | End: 2023-11-15
Attending: EMERGENCY MEDICINE
Payer: MEDICARE

## 2023-11-15 VITALS
HEIGHT: 66 IN | SYSTOLIC BLOOD PRESSURE: 160 MMHG | WEIGHT: 185 LBS | HEART RATE: 63 BPM | OXYGEN SATURATION: 96 % | DIASTOLIC BLOOD PRESSURE: 86 MMHG | BODY MASS INDEX: 29.73 KG/M2 | TEMPERATURE: 97 F | RESPIRATION RATE: 17 BRPM

## 2023-11-15 DIAGNOSIS — N39.0 ACUTE UTI: ICD-10-CM

## 2023-11-15 DIAGNOSIS — R10.31 ACUTE RIGHT LOWER QUADRANT PAIN: Primary | ICD-10-CM

## 2023-11-15 LAB
ALBUMIN SERPL-MCNC: 4.3 G/DL (ref 3.4–4.8)
ALBUMIN/GLOB SERPL: 1.5 RATIO (ref 1.1–2)
ALP SERPL-CCNC: 76 UNIT/L (ref 40–150)
ALT SERPL-CCNC: 14 UNIT/L (ref 0–55)
APPEARANCE UR: CLEAR
AST SERPL-CCNC: 23 UNIT/L (ref 5–34)
BACTERIA #/AREA URNS AUTO: ABNORMAL /HPF
BASOPHILS # BLD AUTO: 0.07 X10(3)/MCL
BASOPHILS NFR BLD AUTO: 1 %
BILIRUB SERPL-MCNC: 2.1 MG/DL
BILIRUB UR QL STRIP.AUTO: ABNORMAL
BUN SERPL-MCNC: 13.2 MG/DL (ref 9.8–20.1)
CALCIUM SERPL-MCNC: 9.1 MG/DL (ref 8.4–10.2)
CHLORIDE SERPL-SCNC: 106 MMOL/L (ref 98–107)
CO2 SERPL-SCNC: 28 MMOL/L (ref 23–31)
COLOR UR AUTO: ABNORMAL
CREAT SERPL-MCNC: 0.88 MG/DL (ref 0.55–1.02)
EOSINOPHIL # BLD AUTO: 0.15 X10(3)/MCL (ref 0–0.9)
EOSINOPHIL NFR BLD AUTO: 2.1 %
ERYTHROCYTE [DISTWIDTH] IN BLOOD BY AUTOMATED COUNT: 14.9 % (ref 11.5–17)
GFR SERPLBLD CREATININE-BSD FMLA CKD-EPI: >60 MLS/MIN/1.73/M2
GLOBULIN SER-MCNC: 2.8 GM/DL (ref 2.4–3.5)
GLUCOSE SERPL-MCNC: 82 MG/DL (ref 82–115)
GLUCOSE UR QL STRIP.AUTO: NORMAL
HCT VFR BLD AUTO: 39.4 % (ref 37–47)
HGB BLD-MCNC: 12.4 G/DL (ref 12–16)
HYALINE CASTS #/AREA URNS LPF: ABNORMAL /LPF
IMM GRANULOCYTES # BLD AUTO: 0.01 X10(3)/MCL (ref 0–0.04)
IMM GRANULOCYTES NFR BLD AUTO: 0.1 %
KETONES UR QL STRIP.AUTO: NEGATIVE
LEUKOCYTE ESTERASE UR QL STRIP.AUTO: 500
LYMPHOCYTES # BLD AUTO: 3.4 X10(3)/MCL (ref 0.6–4.6)
LYMPHOCYTES NFR BLD AUTO: 48.4 %
MCH RBC QN AUTO: 28.7 PG (ref 27–31)
MCHC RBC AUTO-ENTMCNC: 31.5 G/DL (ref 33–36)
MCV RBC AUTO: 91.2 FL (ref 80–94)
MONOCYTES # BLD AUTO: 0.57 X10(3)/MCL (ref 0.1–1.3)
MONOCYTES NFR BLD AUTO: 8.1 %
MUCOUS THREADS URNS QL MICRO: ABNORMAL /LPF
NEUTROPHILS # BLD AUTO: 2.82 X10(3)/MCL (ref 2.1–9.2)
NEUTROPHILS NFR BLD AUTO: 40.3 %
NITRITE UR QL STRIP.AUTO: ABNORMAL
NRBC BLD AUTO-RTO: 0 %
PH UR STRIP.AUTO: 6.5 [PH]
PLATELET # BLD AUTO: 227 X10(3)/MCL (ref 130–400)
PMV BLD AUTO: 10.6 FL (ref 7.4–10.4)
POTASSIUM SERPL-SCNC: 4.3 MMOL/L (ref 3.5–5.1)
PROT SERPL-MCNC: 7.1 GM/DL (ref 5.8–7.6)
PROT UR QL STRIP.AUTO: NEGATIVE
RBC # BLD AUTO: 4.32 X10(6)/MCL (ref 4.2–5.4)
RBC #/AREA URNS AUTO: ABNORMAL /HPF
RBC UR QL AUTO: NEGATIVE
SODIUM SERPL-SCNC: 141 MMOL/L (ref 136–145)
SP GR UR STRIP.AUTO: 1.02 (ref 1–1.03)
SQUAMOUS #/AREA URNS LPF: ABNORMAL /HPF
UROBILINOGEN UR STRIP-ACNC: 4
WBC # SPEC AUTO: 7.02 X10(3)/MCL (ref 4.5–11.5)
WBC #/AREA URNS AUTO: ABNORMAL /HPF

## 2023-11-15 PROCEDURE — 99284 EMERGENCY DEPT VISIT MOD MDM: CPT | Mod: 25

## 2023-11-15 PROCEDURE — 80053 COMPREHEN METABOLIC PANEL: CPT | Performed by: EMERGENCY MEDICINE

## 2023-11-15 PROCEDURE — 87086 URINE CULTURE/COLONY COUNT: CPT | Performed by: EMERGENCY MEDICINE

## 2023-11-15 PROCEDURE — 85025 COMPLETE CBC W/AUTO DIFF WBC: CPT | Performed by: EMERGENCY MEDICINE

## 2023-11-15 PROCEDURE — 81001 URINALYSIS AUTO W/SCOPE: CPT | Performed by: EMERGENCY MEDICINE

## 2023-11-15 RX ORDER — CIPROFLOXACIN 500 MG/1
500 TABLET ORAL 2 TIMES DAILY
Qty: 14 TABLET | Refills: 0 | Status: SHIPPED | OUTPATIENT
Start: 2023-11-15 | End: 2023-11-15 | Stop reason: SDUPTHER

## 2023-11-15 RX ORDER — CIPROFLOXACIN 500 MG/1
500 TABLET ORAL 2 TIMES DAILY
Qty: 14 TABLET | Refills: 0 | Status: SHIPPED | OUTPATIENT
Start: 2023-11-15 | End: 2023-11-22

## 2023-11-15 NOTE — ED PROVIDER NOTES
"Encounter Date: 11/15/2023       History     Chief Complaint   Patient presents with    Abdominal Pain     Pt reports right lower abd pain that radiates to right lower back X 3 days. Pt unable to answer if she is experiencing n/v/d. Does report a lengthy hx of bladder concerns with procedures involving botox and emptying the bladder. LBM last night. Called PCP on call was prescribed Baclofen, did not relieve pain.      68-year-old female presents with right lower quadrant pain radiating toward the right flank aching cramping sharp in nature worse with palpation and with turning the torso.  Pain has been present for 3 days.  She denies any dysuria at present.  She is had problems with her bladder in the past is followed by Gabriela Haji had a cystoscope with Botox injection but she states it did not work and he wants to do another procedure on her but she is been putting it off for now because she is been busy at home.  She states recently, 1 month ago, she would an episode of hematuria with pain and they wrote her a prescription of Phenazopyridine which has helped.  Denies dysuria at present.  Denies bleeding at present.  States she is been constipated for last 3 days used a Fleet's enema did have a bowel movement yesterday.  She is not been taking any over-the-counter stool softeners "because I am stubborn."  She is now followed by pain management due to chronic pain in her SI joints and knee and hip.  She states she was told she has neuropathy.  She is on multiple medications including tramadol baclofen what she does not take it makes her sleepy, clonazepam which she took prior to arrival, duloxetine, gabapentin.  She has a prescription for hyoscyamine but she states she is not been taking it.  She is an old prescription of ciprofloxacin appears to have been from June but she did not complete it.  She does not know why she was on that medicine.  She reports she called her gastroenterologist because he was " having the pain in the right side they recommended she come to the ED to get evaluated to make sure she did not have appendicitis.  According to my review of the records patient has a history of hypertension chronic back pain and abdominal pain history of  section osteoarthritis hypertension , constipation, restless legs, anxiety, fibromyalgia, tarsal tunnel syndrome, colonic spasms recommended to see pain management.  She is now followed by pain management.        Review of patient's allergies indicates:   Allergen Reactions    Adhesive tape-silicones     Penicillins      Past Medical History:   Diagnosis Date    Chronic back pain     Hypertension     OA (osteoarthritis)      Past Surgical History:   Procedure Laterality Date     SECTION      MANDIBLE SURGERY      TOTAL KNEE ARTHROPLASTY      bilateral     Family History   Problem Relation Age of Onset    Colon cancer Neg Hx      Social History     Tobacco Use    Smoking status: Never   Substance Use Topics    Alcohol use: No     Review of Systems   Constitutional:  Negative for chills and fever.   Respiratory:  Negative for cough, chest tightness and shortness of breath.    Cardiovascular:  Negative for chest pain.   Gastrointestinal:  Positive for abdominal pain and constipation. Negative for blood in stool, diarrhea, nausea and vomiting.   Genitourinary:  Negative for dysuria, frequency, hematuria and urgency.   Musculoskeletal:  Negative for myalgias and neck pain.   All other systems reviewed and are negative.      Physical Exam     Initial Vitals [11/15/23 0609]   BP Pulse Resp Temp SpO2   (!) 163/97 62 17 97.3 °F (36.3 °C) 95 %      MAP       --         Physical Exam    Nursing note and vitals reviewed.  Constitutional: She appears well-developed and well-nourished. No distress.   HENT:   Head: Normocephalic and atraumatic.   Eyes: Conjunctivae are normal.   Cardiovascular:  Normal rate and intact distal pulses.           Pulmonary/Chest: No  respiratory distress. She has no rhonchi.   Abdominal: Abdomen is soft. Bowel sounds are normal.   Mild tenderness in the right lower quadrant there is no guarding there is no rebound pain is reproducible with range of motion.  No lumbar tenderness palpation or paraspinous tenderness to palpation There is no rebound and no guarding.   Musculoskeletal:         General: No edema.     Neurological: She is alert. She has normal strength.   Skin: Skin is warm and dry.   Psychiatric: She has a normal mood and affect.         ED Course   Procedures  Labs Reviewed   COMPREHENSIVE METABOLIC PANEL - Abnormal; Notable for the following components:       Result Value    Bilirubin Total 2.1 (*)     All other components within normal limits   URINALYSIS, REFLEX TO URINE CULTURE - Abnormal; Notable for the following components:    Bilirubin, UA 2+ (*)     Urobilinogen, UA 4.0 (*)     Nitrites, UA 2+ (*)     Leukocyte Esterase,  (*)     WBC, UA 21-50 (*)     Bacteria, UA Few (*)     Mucous, UA Moderate (*)     Hyaline Casts, UA 0-2 (*)     RBC, UA 6-10 (*)     All other components within normal limits   CBC WITH DIFFERENTIAL - Abnormal; Notable for the following components:    MCHC 31.5 (*)     MPV 10.6 (*)     All other components within normal limits   CULTURE, URINE   CBC W/ AUTO DIFFERENTIAL    Narrative:     The following orders were created for panel order CBC auto differential.  Procedure                               Abnormality         Status                     ---------                               -----------         ------                     CBC with Differential[[id number]]        Abnormal            Final result                 Please view results for these tests on the individual orders.   URINALYSIS, REFLEX TO URINE CULTURE          Imaging Results              CT Abdomen Pelvis  Without Contrast (Final result)  Result time 11/15/23 07:18:21      Final result by Calos Joseph MD (11/15/23 07:18:21)                    Impression:      No renal calculi or acute obstructive uropathy.      Electronically signed by: Calos Joseph  Date:    11/15/2023  Time:    07:18               Narrative:    EXAMINATION:  CT ABDOMEN PELVIS WITHOUT CONTRAST    CLINICAL HISTORY:  RLQ abdominal pain (Age >= 14y);R flank pain h/o stone;    TECHNIQUE:  Multidetector axial images were obtained from the  diaphragms to below symphysis pubis without the administration of IV contrast. Oral contrast was not administered.    Dose length product of 367 mGycm. Automated exposure control was utilized to minimize radiation dose.    COMPARISON:  July 27, 2023    FINDINGS:  Included lungs are without suspicious nodularity, acute air space infiltrates or fluid within the pleural spaces.    Within limitations of noncontrast technique, no acute findings of the liver, pancreas and spleen identified. Gallbladder wall is not thickened and there is no intraluminal calcified calculus. No apparent biliary dilation.    The adrenal glands noncontrast evaluation is unremarkable. Bilateral kidneys lobular contour reflects some scarring.  There is no hydronephrosis or nephrolithiasis. The ureters appear normal in course and diameter without intra ureteral stone.    The stomach is decompressed and difficult to assess.  Small bowel is normal in caliber.  There is no suggestion of acute appendicitis.  There is no free air or free fluid.  Pericolonic fat is preserved without acute inflammatory strandings. There is no evidence for bowel obstruction.    Urinary bladder appears within normal limits. No intravesical stone identified. There is no pelvic free fluid.    Scoliosis and multilevel degenerative changes of the lumbar spine.                                       Medications - No data to display  Medical Decision Making  Presently patient is comfortable no distress abdominal exam is benign clinically do not feel she has a appendicitis.  Will get labs for further  evaluation.  Discussed differential diagnosis and workup plans with the patient and with her .  They were concerned about a kidney stone she had 1 recently and they do not know if she ever passed it.  They believe that was on the right side.  The way she describes the pain has been worse with palpation and with turning her torso raises concern of abdominal wall pain.  Cystitis is a consideration.  Appendicitis is a consideration but felt to be less likely.  Kidney stone is a course concern as well.  Constipation could also be contributing to her symptoms and I recommend she use an over-the-counter stool softener such as MiraLax or Dulcolax to help as well.  She is followed by pain management has multiple pain medicines that she was taking.  I explained if the baclofen makes her sleepy they maybe up to try an alternative lower dose muscle relaxer if they feel she needs another medication.    Problems Addressed:  Acute right lower quadrant pain: acute illness or injury    Amount and/or Complexity of Data Reviewed  Labs: ordered.  Radiology: ordered.               ED Course as of 11/15/23 0855   Wed Nov 15, 2023   0750 CT scan is reassuring CT does not provide evidence of appendicitis and again no fever no white count low clinical suspicion of appendicitis.  No renal calculi visualized.  Electrolytes and CBC are unremarkable.  Urinalysis pending [LF]   0853 Urinalysis is concerning for urinary tract infection.  I will put her back on ciprofloxacin.  Counseled on the importance of taking the entire prescription [LF]      ED Course User Index  [LF] Jian Jones MD                    Clinical Impression:   Final diagnoses:  [R10.31] Acute right lower quadrant pain (Primary)  [N39.0] Acute UTI        ED Disposition Condition    Discharge Stable          ED Prescriptions       Medication Sig Dispense Start Date End Date Auth. Provider    ciprofloxacin HCl (CIPRO) 500 MG tablet Take 1 tablet (500 mg total) by  mouth 2 (two) times daily. for 7 days 14 tablet 11/15/2023 11/22/2023 Jian Jones MD          Follow-up Information       Follow up With Specialties Details Why Contact Info    Haroldo Escalona MD Family Medicine Schedule an appointment as soon as possible for a visit   [address] [facility] [address]  [phone]      Dyllan Haji MD Urology Schedule an appointment as soon as possible for a visit   [address] [facility] [address]  [phone]               Jian Jones MD  11/15/23 1081

## 2023-11-17 LAB — BACTERIA UR CULT: NORMAL

## 2023-11-20 ENCOUNTER — HOSPITAL ENCOUNTER (EMERGENCY)
Facility: HOSPITAL | Age: 68
Discharge: HOME | End: 2023-11-20
Attending: EMERGENCY MEDICINE
Payer: MEDICARE

## 2023-11-20 VITALS
DIASTOLIC BLOOD PRESSURE: 97 MMHG | RESPIRATION RATE: 18 BRPM | HEART RATE: 66 BPM | TEMPERATURE: 96 F | SYSTOLIC BLOOD PRESSURE: 129 MMHG | WEIGHT: 175 LBS | BODY MASS INDEX: 28.25 KG/M2 | OXYGEN SATURATION: 97 %

## 2023-11-20 DIAGNOSIS — K59.00 CONSTIPATION, UNSPECIFIED CONSTIPATION TYPE: Primary | ICD-10-CM

## 2023-11-20 DIAGNOSIS — K59.00 CONSTIPATION: ICD-10-CM

## 2023-11-20 LAB
ALBUMIN SERPL-MCNC: 4.2 G/DL (ref 3.4–4.8)
ALBUMIN/GLOB SERPL: 1.7 RATIO (ref 1.1–2)
ALP SERPL-CCNC: 76 UNIT/L (ref 40–150)
ALT SERPL-CCNC: 12 UNIT/L (ref 0–55)
APPEARANCE UR: ABNORMAL
AST SERPL-CCNC: 23 UNIT/L (ref 5–34)
BACTERIA #/AREA URNS AUTO: ABNORMAL /HPF
BASOPHILS # BLD AUTO: 0.05 X10(3)/MCL
BASOPHILS NFR BLD AUTO: 0.7 %
BILIRUB SERPL-MCNC: 2.5 MG/DL
BILIRUB UR QL STRIP.AUTO: ABNORMAL
BUN SERPL-MCNC: 12.9 MG/DL (ref 9.8–20.1)
CALCIUM SERPL-MCNC: 9 MG/DL (ref 8.4–10.2)
CHLORIDE SERPL-SCNC: 104 MMOL/L (ref 98–107)
CO2 SERPL-SCNC: 30 MMOL/L (ref 23–31)
COLOR UR AUTO: ABNORMAL
CREAT SERPL-MCNC: 0.95 MG/DL (ref 0.55–1.02)
EOSINOPHIL # BLD AUTO: 0.17 X10(3)/MCL (ref 0–0.9)
EOSINOPHIL NFR BLD AUTO: 2.5 %
ERYTHROCYTE [DISTWIDTH] IN BLOOD BY AUTOMATED COUNT: 15.3 % (ref 11.5–17)
GFR SERPLBLD CREATININE-BSD FMLA CKD-EPI: >60 MLS/MIN/1.73/M2
GLOBULIN SER-MCNC: 2.5 GM/DL (ref 2.4–3.5)
GLUCOSE SERPL-MCNC: 98 MG/DL (ref 82–115)
GLUCOSE UR QL STRIP.AUTO: NORMAL
HCT VFR BLD AUTO: 36.5 % (ref 37–47)
HGB BLD-MCNC: 11.2 G/DL (ref 12–16)
IMM GRANULOCYTES # BLD AUTO: 0.01 X10(3)/MCL (ref 0–0.04)
IMM GRANULOCYTES NFR BLD AUTO: 0.1 %
KETONES UR QL STRIP.AUTO: NEGATIVE
LEUKOCYTE ESTERASE UR QL STRIP.AUTO: NEGATIVE
LIPASE SERPL-CCNC: 28 U/L
LYMPHOCYTES # BLD AUTO: 2.52 X10(3)/MCL (ref 0.6–4.6)
LYMPHOCYTES NFR BLD AUTO: 36.6 %
MAGNESIUM SERPL-MCNC: 2.2 MG/DL (ref 1.6–2.6)
MCH RBC QN AUTO: 29 PG (ref 27–31)
MCHC RBC AUTO-ENTMCNC: 30.7 G/DL (ref 33–36)
MCV RBC AUTO: 94.6 FL (ref 80–94)
MONOCYTES # BLD AUTO: 0.59 X10(3)/MCL (ref 0.1–1.3)
MONOCYTES NFR BLD AUTO: 8.6 %
NEUTROPHILS # BLD AUTO: 3.54 X10(3)/MCL (ref 2.1–9.2)
NEUTROPHILS NFR BLD AUTO: 51.5 %
NITRITE UR QL STRIP.AUTO: ABNORMAL
NRBC BLD AUTO-RTO: 0 %
PH UR STRIP.AUTO: 8.5 [PH]
PLATELET # BLD AUTO: 196 X10(3)/MCL (ref 130–400)
PMV BLD AUTO: 11.1 FL (ref 7.4–10.4)
POTASSIUM SERPL-SCNC: 4.1 MMOL/L (ref 3.5–5.1)
PROT SERPL-MCNC: 6.7 GM/DL (ref 5.8–7.6)
PROT UR QL STRIP.AUTO: ABNORMAL
RBC # BLD AUTO: 3.86 X10(6)/MCL (ref 4.2–5.4)
RBC #/AREA URNS AUTO: ABNORMAL /HPF
RBC UR QL AUTO: NEGATIVE
SODIUM SERPL-SCNC: 141 MMOL/L (ref 136–145)
SP GR UR STRIP.AUTO: 1.02 (ref 1–1.03)
SQUAMOUS #/AREA URNS LPF: ABNORMAL /HPF
UROBILINOGEN UR STRIP-ACNC: 2
WBC # SPEC AUTO: 6.88 X10(3)/MCL (ref 4.5–11.5)
WBC #/AREA URNS AUTO: ABNORMAL /HPF

## 2023-11-20 PROCEDURE — 83735 ASSAY OF MAGNESIUM: CPT | Performed by: NURSE PRACTITIONER

## 2023-11-20 PROCEDURE — 80053 COMPREHEN METABOLIC PANEL: CPT | Performed by: NURSE PRACTITIONER

## 2023-11-20 PROCEDURE — 81001 URINALYSIS AUTO W/SCOPE: CPT | Performed by: NURSE PRACTITIONER

## 2023-11-20 PROCEDURE — 85025 COMPLETE CBC W/AUTO DIFF WBC: CPT | Performed by: NURSE PRACTITIONER

## 2023-11-20 PROCEDURE — 83690 ASSAY OF LIPASE: CPT | Performed by: NURSE PRACTITIONER

## 2023-11-20 PROCEDURE — 99284 EMERGENCY DEPT VISIT MOD MDM: CPT

## 2023-11-20 RX ORDER — PROPRANOLOL HYDROCHLORIDE 20 MG/1
20 TABLET ORAL 3 TIMES DAILY
COMMUNITY

## 2023-11-20 RX ORDER — ONDANSETRON 4 MG/1
4 TABLET, ORALLY DISINTEGRATING ORAL EVERY 8 HOURS PRN
Qty: 20 TABLET | Refills: 0 | Status: SHIPPED | OUTPATIENT
Start: 2023-11-20

## 2023-11-20 RX ORDER — OMEPRAZOLE 40 MG/1
40 CAPSULE, DELAYED RELEASE ORAL DAILY
COMMUNITY

## 2023-11-20 RX ORDER — TRAMADOL HYDROCHLORIDE 50 MG/1
50 TABLET ORAL EVERY 6 HOURS PRN
Qty: 15 TABLET | Refills: 0 | Status: SHIPPED | OUTPATIENT
Start: 2023-11-20 | End: 2023-12-05

## 2023-11-20 RX ORDER — LACTULOSE 10 G/15ML
15 SOLUTION ORAL 2 TIMES DAILY PRN
Qty: 60 ML | Refills: 0 | Status: SHIPPED | OUTPATIENT
Start: 2023-11-20

## 2023-11-20 RX ORDER — RIZATRIPTAN BENZOATE 10 MG/1
10 TABLET, ORALLY DISINTEGRATING ORAL
COMMUNITY

## 2023-11-20 RX ORDER — LEVOTHYROXINE SODIUM 112 UG/1
112 TABLET ORAL
COMMUNITY

## 2023-11-20 RX ORDER — GABAPENTIN 300 MG/1
300 CAPSULE ORAL 3 TIMES DAILY
COMMUNITY

## 2023-11-20 RX ORDER — BUSPIRONE HYDROCHLORIDE 10 MG/1
10 TABLET ORAL 3 TIMES DAILY
COMMUNITY

## 2023-11-20 RX ORDER — PHENAZOPYRIDINE HYDROCHLORIDE 200 MG/1
200 TABLET, FILM COATED ORAL 3 TIMES DAILY PRN
COMMUNITY

## 2023-11-20 RX ORDER — BACLOFEN 10 MG/1
10 TABLET ORAL 3 TIMES DAILY
COMMUNITY

## 2023-11-20 NOTE — ED PROVIDER NOTES
Encounter Date: 2023       History     Chief Complaint   Patient presents with    Constipation     Constipation since Wednesday, reports lower back pain. States not improved with meds. Reports nausea.     Patient states constipation x 1 week. States lower abdominal pain and lower back pain. States intermittent nausea. Denies any vomiting, fever, or dysuria. States that she is passing flatus. States that she took OTC laxatives with only one small bowel movement. Hx. Of HTN.     The history is provided by the patient.   Patient is awake, alert, afebrile, and nontoxic appearing in the ED.       The history is provided by the patient.     Review of patient's allergies indicates:   Allergen Reactions    Adhesive tape-silicones     Penicillins      Past Medical History:   Diagnosis Date    Chronic back pain     Hypertension     OA (osteoarthritis)      Past Surgical History:   Procedure Laterality Date     SECTION      MANDIBLE SURGERY      TOTAL KNEE ARTHROPLASTY      bilateral     Family History   Problem Relation Age of Onset    Colon cancer Neg Hx      Social History     Tobacco Use    Smoking status: Never   Substance Use Topics    Alcohol use: No     Review of Systems   Constitutional: Negative.  Negative for chills and fever.   HENT: Negative.     Eyes: Negative.    Respiratory: Negative.     Cardiovascular: Negative.    Gastrointestinal:  Positive for abdominal pain and constipation. Negative for vomiting.   Endocrine: Negative.    Genitourinary: Negative.    Musculoskeletal: Negative.    Skin: Negative.    Allergic/Immunologic: Negative.    Neurological: Negative.    Hematological: Negative.    Psychiatric/Behavioral: Negative.     All other systems reviewed and are negative.      Physical Exam     Initial Vitals [23 1248]   BP Pulse Resp Temp SpO2   131/78 68 18 96 °F (35.6 °C) 98 %      MAP       --         Physical Exam    Nursing note and vitals reviewed.  Constitutional: She appears  well-developed and well-nourished. No distress.   HENT:   Head: Normocephalic and atraumatic.   Mouth/Throat: Oropharynx is clear and moist.   Eyes: Conjunctivae and EOM are normal. Pupils are equal, round, and reactive to light.   Neck: Neck supple.   Normal range of motion.  Cardiovascular:  Normal rate, regular rhythm, normal heart sounds and intact distal pulses.           Pulmonary/Chest: Breath sounds normal. No respiratory distress. She has no wheezes.   Abdominal: Abdomen is soft. Bowel sounds are normal. She exhibits no distension. There is no abdominal tenderness.   Genitourinary:    Rectum normal.      Genitourinary Comments: No fecal impaction present on rectal exam.      Musculoskeletal:         General: No tenderness or edema. Normal range of motion.      Cervical back: Normal range of motion and neck supple.     Neurological: She is alert and oriented to person, place, and time. She has normal strength. GCS score is 15. GCS eye subscore is 4. GCS verbal subscore is 5. GCS motor subscore is 6.   Skin: Skin is warm and dry. No rash noted.   Psychiatric: She has a normal mood and affect. Thought content normal.         ED Course   Procedures  Labs Reviewed   CBC WITH DIFFERENTIAL - Abnormal; Notable for the following components:       Result Value    RBC 3.86 (*)     Hgb 11.2 (*)     Hct 36.5 (*)     MCV 94.6 (*)     MCHC 30.7 (*)     MPV 11.1 (*)     All other components within normal limits   COMPREHENSIVE METABOLIC PANEL - Abnormal; Notable for the following components:    Bilirubin Total 2.5 (*)     All other components within normal limits   URINALYSIS, REFLEX TO URINE CULTURE - Abnormal; Notable for the following components:    Appearance, UA Turbid (*)     Protein, UA Trace (*)     Bilirubin, UA 1+ (*)     Urobilinogen, UA 2.0 (*)     Nitrites, UA 1+ (*)     All other components within normal limits    Narrative:     Many bilirubin crystals seen   LIPASE - Normal   MAGNESIUM - Normal           Imaging Results              X-Ray Abdomen Flat And Erect (Final result)  Result time 11/20/23 18:47:18      Final result by Mendy Reeder MD (11/20/23 18:47:18)                   Impression:      Large colonic stool volume with nonobstructive bowel gas.      Electronically signed by: Mendy Reeder  Date:    11/20/2023  Time:    18:47               Narrative:    EXAMINATION:  XR ABDOMEN FLAT AND ERECT    CLINICAL HISTORY:  Constipation, unspecified    COMPARISON:  None.    FINDINGS:  There is no free intraperitoneal air.  There is a large colonic stool volume.  The bowel gas pattern is nonobstructive.  The lung bases are clear.                                       Medications - No data to display  Medical Decision Making  Patient states constipation x 1 week. States lower abdominal pain and lower back pain. States intermittent nausea. Denies any vomiting, fever, or dysuria. States that she is passing flatus. States that she took OTC laxatives with only one small bowel movement. Hx. Of HTN.     The history is provided by the patient.   Patient is awake, alert, afebrile, and nontoxic appearing in the ED.     Amount and/or Complexity of Data Reviewed  External Data Reviewed: notes.     Details: Reviewed records from recent ED visit with similar complaints.   Labs: ordered. Decision-making details documented in ED Course.  Radiology: ordered. Decision-making details documented in ED Course.  Discussion of management or test interpretation with external provider(s): Differential diagnosis (including but not limited to):   Judging by the patient's chief complaint and pertinent history, the patient has the following possible differential diagnoses, including but not limited to the following.  Some of these are deemed to be lower likelihood and some more likely based on my physical exam and history combined with possible lab work and/or imaging studies.   Please see the pertinent studies, and refer to the  HPI.  Some of these diagnoses will take further evaluation to fully rule out, perhaps as an outpatient and the patient was encouraged to follow up when discharged for more comprehensive evaluation.  Constipation, Fecal Impaction, Abdominal Pain  Patient abdominal x-ray shows a large amount of stool consistent with constipation. Offered patient enemas in the ED but she declined and would like to be discharged with a prescription laxative. ED return precautions were given.                  ED Course as of 11/21/23 0207   Mon Nov 20, 2023 1909 X-Ray Abdomen Flat And Erect [AB]   1909 Comprehensive Metabolic Panel(!) [AB]   1909 CBC with Differential(!) [AB]   1909 Urinalysis, Reflex to Urine Culture(!) [AB]   1909 Magnesium [AB]   1909 Lipase [AB]      ED Course User Index  [AB] Mai Chawla FNP                        Clinical Impression:  Final diagnoses:  [K59.00] Constipation  [K59.00] Constipation, unspecified constipation type (Primary)          ED Disposition Condition    Discharge Stable          ED Prescriptions       Medication Sig Dispense Start Date End Date Auth. Provider    ondansetron (ZOFRAN-ODT) 4 MG TbDL Take 1 tablet (4 mg total) by mouth every 8 (eight) hours as needed (Nausea). 20 tablet 11/20/2023 -- Mai Chawla FNP    lactulose (CHRONULAC) 20 gram/30 mL Soln Take 23 mLs (15 g total) by mouth 2 (two) times daily as needed (Constipation). 60 mL 11/20/2023 -- Mai Chawla FNP    traMADoL (ULTRAM) 50 mg tablet Take 1 tablet (50 mg total) by mouth every 6 (six) hours as needed for Pain. 15 tablet 11/20/2023 12/5/2023 Mai Chawla FNP          Follow-up Information       Follow up With Specialties Details Why Contact Info    Haroldo Escalona MD Family Medicine In 3 days  [address] [facility] [address]  [phone]               Mai Chawla FNP  11/21/23 0207

## 2023-11-20 NOTE — FIRST PROVIDER EVALUATION
Medical screening examination initiated.  I have conducted a focused provider triage encounter, findings are as follows:    Brief history of present illness:  69 y/o female who presents with low back pain with constipation with last normal BM Wednesday. Nausea. No vomiting. States tried otc meds without relief.     There were no vitals filed for this visit.    Pertinent physical exam:  alert, nonlabored, ambulatory     Brief workup plan:  labs, urine    Preliminary workup initiated; this workup will be continued and followed by the physician or advanced practice provider that is assigned to the patient when roomed.